# Patient Record
Sex: FEMALE | Race: WHITE | NOT HISPANIC OR LATINO | Employment: FULL TIME | URBAN - NONMETROPOLITAN AREA
[De-identification: names, ages, dates, MRNs, and addresses within clinical notes are randomized per-mention and may not be internally consistent; named-entity substitution may affect disease eponyms.]

---
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
## 2023-02-05 ENCOUNTER — HOSPITAL ENCOUNTER (EMERGENCY)
Facility: HOSPITAL | Age: 18
Discharge: HOME | End: 2023-02-05
Attending: STUDENT IN AN ORGANIZED HEALTH CARE EDUCATION/TRAINING PROGRAM | Admitting: EMERGENCY MEDICINE
Payer: COMMERCIAL

## 2023-02-05 VITALS
OXYGEN SATURATION: 97 % | HEART RATE: 80 BPM | DIASTOLIC BLOOD PRESSURE: 77 MMHG | TEMPERATURE: 98.1 F | HEIGHT: 69 IN | RESPIRATION RATE: 14 BRPM | SYSTOLIC BLOOD PRESSURE: 123 MMHG | BODY MASS INDEX: 19.26 KG/M2 | WEIGHT: 130 LBS

## 2023-02-05 DIAGNOSIS — T50.902A INTENTIONAL OVERDOSE, INITIAL ENCOUNTER: Primary | ICD-10-CM

## 2023-02-05 LAB
ALBUMIN SERPL-MCNC: 4.7 G/DL (ref 3.5–5.2)
ALBUMIN/GLOB SERPL: 1.6 G/DL
ALP SERPL-CCNC: 66 U/L (ref 39–117)
ALT SERPL W P-5'-P-CCNC: <5 U/L (ref 1–33)
AMMONIA BLD-SCNC: 23 UMOL/L (ref 11–51)
AMPHET+METHAMPHET UR QL: NEGATIVE
AMPHETAMINES UR QL: NEGATIVE
ANION GAP SERPL CALCULATED.3IONS-SCNC: 10.5 MMOL/L (ref 5–15)
APAP SERPL-MCNC: <5 MCG/ML (ref 0–30)
AST SERPL-CCNC: 13 U/L (ref 1–32)
B-HCG UR QL: NEGATIVE
BARBITURATES UR QL SCN: NEGATIVE
BASOPHILS # BLD AUTO: 0.04 10*3/MM3 (ref 0–0.2)
BASOPHILS NFR BLD AUTO: 0.6 % (ref 0–1.5)
BENZODIAZ UR QL SCN: NEGATIVE
BILIRUB SERPL-MCNC: 0.4 MG/DL (ref 0–1.2)
BILIRUB UR QL STRIP: NEGATIVE
BUN SERPL-MCNC: 5 MG/DL (ref 6–20)
BUN/CREAT SERPL: 8.2 (ref 7–25)
BUPRENORPHINE SERPL-MCNC: NEGATIVE NG/ML
CALCIUM SPEC-SCNC: 9.5 MG/DL (ref 8.6–10.5)
CANNABINOIDS SERPL QL: NEGATIVE
CHLORIDE SERPL-SCNC: 101 MMOL/L (ref 98–107)
CLARITY UR: CLEAR
CO2 SERPL-SCNC: 25.5 MMOL/L (ref 22–29)
COCAINE UR QL: NEGATIVE
COLOR UR: YELLOW
CREAT SERPL-MCNC: 0.61 MG/DL (ref 0.57–1)
CRP SERPL-MCNC: <0.3 MG/DL (ref 0–0.5)
D-LACTATE SERPL-SCNC: 1.8 MMOL/L (ref 0.5–2)
DEPRECATED RDW RBC AUTO: 37.4 FL (ref 37–54)
EGFRCR SERPLBLD CKD-EPI 2021: 132.3 ML/MIN/1.73
EOSINOPHIL # BLD AUTO: 0.04 10*3/MM3 (ref 0–0.4)
EOSINOPHIL NFR BLD AUTO: 0.6 % (ref 0.3–6.2)
ERYTHROCYTE [DISTWIDTH] IN BLOOD BY AUTOMATED COUNT: 11.7 % (ref 12.3–15.4)
ETHANOL BLD-MCNC: 90 MG/DL (ref 0–10)
ETHANOL UR QL: 0.09 %
GLOBULIN UR ELPH-MCNC: 2.9 GM/DL
GLUCOSE SERPL-MCNC: 90 MG/DL (ref 65–99)
GLUCOSE UR STRIP-MCNC: NEGATIVE MG/DL
HCT VFR BLD AUTO: 41.9 % (ref 34–46.6)
HGB BLD-MCNC: 14.4 G/DL (ref 12–15.9)
HGB UR QL STRIP.AUTO: NEGATIVE
HOLD SPECIMEN: NORMAL
HOLD SPECIMEN: NORMAL
IMM GRANULOCYTES # BLD AUTO: 0.01 10*3/MM3 (ref 0–0.05)
IMM GRANULOCYTES NFR BLD AUTO: 0.1 % (ref 0–0.5)
KETONES UR QL STRIP: NEGATIVE
LEUKOCYTE ESTERASE UR QL STRIP.AUTO: NEGATIVE
LIPASE SERPL-CCNC: 26 U/L (ref 13–60)
LYMPHOCYTES # BLD AUTO: 2.61 10*3/MM3 (ref 0.7–3.1)
LYMPHOCYTES NFR BLD AUTO: 37.4 % (ref 19.6–45.3)
MAGNESIUM SERPL-MCNC: 1.9 MG/DL (ref 1.7–2.2)
MCH RBC QN AUTO: 30.6 PG (ref 26.6–33)
MCHC RBC AUTO-ENTMCNC: 34.4 G/DL (ref 31.5–35.7)
MCV RBC AUTO: 89 FL (ref 79–97)
METHADONE UR QL SCN: NEGATIVE
MONOCYTES # BLD AUTO: 0.36 10*3/MM3 (ref 0.1–0.9)
MONOCYTES NFR BLD AUTO: 5.2 % (ref 5–12)
NEUTROPHILS NFR BLD AUTO: 3.92 10*3/MM3 (ref 1.7–7)
NEUTROPHILS NFR BLD AUTO: 56.1 % (ref 42.7–76)
NITRITE UR QL STRIP: NEGATIVE
NRBC BLD AUTO-RTO: 0 /100 WBC (ref 0–0.2)
OPIATES UR QL: NEGATIVE
OXYCODONE UR QL SCN: NEGATIVE
PCP UR QL SCN: NEGATIVE
PH UR STRIP.AUTO: 6.5 [PH] (ref 5–8)
PHOSPHATE SERPL-MCNC: 3 MG/DL (ref 2.5–4.5)
PLATELET # BLD AUTO: 290 10*3/MM3 (ref 140–450)
PMV BLD AUTO: 9.5 FL (ref 6–12)
POTASSIUM SERPL-SCNC: 3.9 MMOL/L (ref 3.5–5.2)
PROPOXYPH UR QL: NEGATIVE
PROT SERPL-MCNC: 7.6 G/DL (ref 6–8.5)
PROT UR QL STRIP: NEGATIVE
RBC # BLD AUTO: 4.71 10*6/MM3 (ref 3.77–5.28)
SALICYLATES SERPL-MCNC: <0.3 MG/DL
SARS-COV-2 RNA PNL SPEC NAA+PROBE: NOT DETECTED
SODIUM SERPL-SCNC: 137 MMOL/L (ref 136–145)
SP GR UR STRIP: 1.01 (ref 1–1.03)
TRICYCLICS UR QL SCN: NEGATIVE
TROPONIN T SERPL-MCNC: <0.01 NG/ML (ref 0–0.03)
UROBILINOGEN UR QL STRIP: NORMAL
WBC NRBC COR # BLD: 6.98 10*3/MM3 (ref 3.4–10.8)
WHOLE BLOOD HOLD COAG: NORMAL
WHOLE BLOOD HOLD SPECIMEN: NORMAL

## 2023-02-05 PROCEDURE — 80053 COMPREHEN METABOLIC PANEL: CPT | Performed by: STUDENT IN AN ORGANIZED HEALTH CARE EDUCATION/TRAINING PROGRAM

## 2023-02-05 PROCEDURE — 87635 SARS-COV-2 COVID-19 AMP PRB: CPT | Performed by: STUDENT IN AN ORGANIZED HEALTH CARE EDUCATION/TRAINING PROGRAM

## 2023-02-05 PROCEDURE — 83690 ASSAY OF LIPASE: CPT | Performed by: STUDENT IN AN ORGANIZED HEALTH CARE EDUCATION/TRAINING PROGRAM

## 2023-02-05 PROCEDURE — 83605 ASSAY OF LACTIC ACID: CPT | Performed by: STUDENT IN AN ORGANIZED HEALTH CARE EDUCATION/TRAINING PROGRAM

## 2023-02-05 PROCEDURE — 99285 EMERGENCY DEPT VISIT HI MDM: CPT

## 2023-02-05 PROCEDURE — 93005 ELECTROCARDIOGRAM TRACING: CPT | Performed by: STUDENT IN AN ORGANIZED HEALTH CARE EDUCATION/TRAINING PROGRAM

## 2023-02-05 PROCEDURE — 81003 URINALYSIS AUTO W/O SCOPE: CPT | Performed by: STUDENT IN AN ORGANIZED HEALTH CARE EDUCATION/TRAINING PROGRAM

## 2023-02-05 PROCEDURE — 83735 ASSAY OF MAGNESIUM: CPT | Performed by: STUDENT IN AN ORGANIZED HEALTH CARE EDUCATION/TRAINING PROGRAM

## 2023-02-05 PROCEDURE — 80179 DRUG ASSAY SALICYLATE: CPT | Performed by: STUDENT IN AN ORGANIZED HEALTH CARE EDUCATION/TRAINING PROGRAM

## 2023-02-05 PROCEDURE — 82077 ASSAY SPEC XCP UR&BREATH IA: CPT | Performed by: STUDENT IN AN ORGANIZED HEALTH CARE EDUCATION/TRAINING PROGRAM

## 2023-02-05 PROCEDURE — 84484 ASSAY OF TROPONIN QUANT: CPT | Performed by: STUDENT IN AN ORGANIZED HEALTH CARE EDUCATION/TRAINING PROGRAM

## 2023-02-05 PROCEDURE — 80306 DRUG TEST PRSMV INSTRMNT: CPT | Performed by: STUDENT IN AN ORGANIZED HEALTH CARE EDUCATION/TRAINING PROGRAM

## 2023-02-05 PROCEDURE — 84100 ASSAY OF PHOSPHORUS: CPT | Performed by: STUDENT IN AN ORGANIZED HEALTH CARE EDUCATION/TRAINING PROGRAM

## 2023-02-05 PROCEDURE — 86140 C-REACTIVE PROTEIN: CPT | Performed by: STUDENT IN AN ORGANIZED HEALTH CARE EDUCATION/TRAINING PROGRAM

## 2023-02-05 PROCEDURE — 81025 URINE PREGNANCY TEST: CPT | Performed by: STUDENT IN AN ORGANIZED HEALTH CARE EDUCATION/TRAINING PROGRAM

## 2023-02-05 PROCEDURE — 82140 ASSAY OF AMMONIA: CPT | Performed by: STUDENT IN AN ORGANIZED HEALTH CARE EDUCATION/TRAINING PROGRAM

## 2023-02-05 PROCEDURE — 85025 COMPLETE CBC W/AUTO DIFF WBC: CPT | Performed by: STUDENT IN AN ORGANIZED HEALTH CARE EDUCATION/TRAINING PROGRAM

## 2023-02-05 PROCEDURE — 93005 ELECTROCARDIOGRAM TRACING: CPT | Performed by: EMERGENCY MEDICINE

## 2023-02-05 PROCEDURE — C9803 HOPD COVID-19 SPEC COLLECT: HCPCS

## 2023-02-05 PROCEDURE — 80143 DRUG ASSAY ACETAMINOPHEN: CPT | Performed by: STUDENT IN AN ORGANIZED HEALTH CARE EDUCATION/TRAINING PROGRAM

## 2023-02-05 RX ORDER — SODIUM CHLORIDE 0.9 % (FLUSH) 0.9 %
10 SYRINGE (ML) INJECTION AS NEEDED
Status: DISCONTINUED | OUTPATIENT
Start: 2023-02-05 | End: 2023-02-05 | Stop reason: HOSPADM

## 2023-02-05 NOTE — NURSING NOTE
Mom presented with pt, sitting in the quiet room.  Mom reports pt has a hx of cutting/self harm since age 10yo. Pt battles depression/anxiety and takes prozac and abilify daily until she admitted to parents around carolyn she had stopped taking her medications.  Pt drinks alcohol, smokes marijuana and stays out late at night.   Pt has recently been diagnosed with borderline personality disorder and is currently in a [employer] study.   Mom reports pt lives at home with she and her  and their 3yo child. Both parents are nurses. 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

## 2023-02-05 NOTE — ED NOTES
Called behavioral health to inquire about consult and to inform them about recommendation from poison control. Behavioral health to evaluate pt between [id number] today.

## 2023-02-05 NOTE — CONSULTS
"Joanie Renae  2005    Preferred Pronouns: She/Her      Time Called for Assessment: 1255     Assessment Start and End: 7032-0207    Orientation: alert and oriented to person, place, and time     Is patient agreeable to admission/treatment? Yes    Guardian Name/Contact/etc: Self    Pt Lives With: Mother (Roshni), Stepfather (Pierce) and 3yo sister    Highest Level of Education: high school diploma/GED     Presenting Problems: Patient presents to emergency department following an intentional overdose. Patient reports taking Abilify 5mg 10 tablets, Prozac 20mg 10 tablets and Advil (unknown mg) at 0430 this morning. Immediately following attempt, patient regretted decision, informed her mother and arrived to the emergency department.     Mood: within normal limits     Current Stressors: illness and mental health condition    Depression: 2     Hopelessness: no    Anxiety: 4    Sleep: Fair    Appetite: Good    Delusions: Patient presents with linear thought processing.     Hallucinations: None    Homicidal Ideations: Absent     Current Mental Healthcare: Patient is currently involved in a BPD Research Study at . She meets with a  student once a week for assessments and DBT. Patient reports this last 18 weeks and she is penitentiary through. Patient reports seeing Hali Aquino MD at  for medication management. No current enrolled in therapy. Has an upcoming Psychiatrist appointment on 2/7/2023 for medication and meets with the BPD research program on Tuesday, 2/7/2023 as well.     Current Psychiatric Medications: Patient reports she was taking Abilify 5mg and Prozac 20mg. She admits she stopped taking these around 1 month ago because she did not \"feel good\" when taking them. Patient reports she is planning to re-discuss medication with her doctor at upcoming appointment this week.    Hx of Psychiatric Treatment: No. Patient denies any hx of inpatient psych treatment.    Most recent inpatient admission: " n/a    Last outpatient visit: Research appointment, 1 week ago. Psychiatrist 1 month ago.       [place]-SUICIDE SEVERITY RATING SCALE  Psychiatric Inpatient Setting - Discharge Screener    Ask questions that are bold and underlined Discharge   Ask Questions 1 and 2 YES NO   1) Wish to be Dead:   Person endorses thoughts about a wish to be dead or not alive anymore, or wish to fall asleep and not wake up.  While you were here in the hospital, have you wished you were dead or wished you could go to sleep and not wake up?  X   2) Suicidal Thoughts:   General non-specific thoughts of wanting to end one's life/die by suicide, “I've thought about killing myself” without general thoughts of ways to kill oneself/associated methods, intent, or plan.   While you were here in the hospital, have you actually had thoughts about killing yourself?   X   If YES to 2, ask questions 3, 4, 5, and 6.  If NO to 2, go directly to question 6   3) Suicidal Thoughts with Method (without Specific Plan or Intent to Act):   Person endorses thoughts of suicide and has thought of a least one method during the assessment period. This is different than a specific plan with time, place or method details worked out. “I thought about taking an overdose but I never made a specific plan as to when where or how I would actually do it….and I would never go through with it.”   Have you been thinking about how you might kill yourself?      4) Suicidal Intent (without Specific Plan):   Active suicidal thoughts of killing oneself and patient reports having some intent to act on such thoughts, as opposed to “I have the thoughts but I definitely will not do anything about them.”   Have you had these thoughts and had some intention of acting on them or do you have some intention of acting on them after you leave the hospital?      5) Suicide Intent with Specific Plan:   Thoughts of killing oneself with details of plan fully or partially worked out and person  "has some intent to carry it out.   Have you started to work out or worked out the details of how to kill yourself either for while you were here in the hospital or for after you leave the hospital? Do you intend to carry out this plan?        6) Suicide Behavior    While you were here in the hospital, have you done anything, started to do anything, or prepared to do anything to end your life?    Examples: Took pills, cut yourself, tried to hang yourself, took out pills but didn't swallow any because you changed your mind or someone took them from you, collected pills, secured a means of obtaining a gun, gave away valuables, wrote a will or suicide note, etc.  X     Suicidal: Absent. Patient is denying a current death wish, plan or intent to kill/harm self. Patient reports as soon as she took medication, she had \"instant regret\" and does not wish to be dead.     Previous Attempts: None. (current attempt is reported 1st).     Most Recent Attempt: 0430 this morning.     PSYCHOSOCIAL HISTORY:    Highest Level of Education: high school diploma/GED     Family Hx of Mental Health/Substance Abuse:     Patient Trauma/Abuse History: Further details: Patient admits to hx of PTSD. Does not disclose further. Denies current abuse.     Does this require reporting: No    Legal History / History of Violence: The patient has no significant history of legal issues.      History of Inappropriate Sexual Behavior: No      SUBSTANCE USE HISTORY:    Substance Use History:    MAT/MOUD:       SUBSTANCE   PRESENT USE  Y/N   AGE @ 1ST USE    ROUTE   HOW MUCH & OFTEN   HOW LONG AT THIS RATE   DATE/AMOUNT OF LAST USE   Nicotine N        Alcohol Y   Once a week (socially)  1 week ago   Marijuana N     2 months ago   Benzos         Neurontin         Methadone         Opiates/ Fentanyl          Cocaine          Heroin         Meth/Ice         Suboxone             History of DT's: No    Current Medical Conditions or Biomedical Complications: Yes. " "Patient reports dx of BPD, PTSD, Depression and Anxiety.      DATA:   This therapist received a call from [facility] staff LUKAS Christensen, with orders from Dr. Lugo, for a behavioral health consult.  The patient is agreeable to speak with the behavioral health team.  Met with patient at bedside. Patient is under 1:1 security monitoring during assessment.  Patient is a 17 year old, single, , female residing in [place], [place]. Patient currently lives with mother, stepfather and 3yo sister.     Patient presents today with chief compliant of suicide attempt. Patient presents to emergency department following an intentional overdose. Patient reports taking Abilify 5mg 10 tablets, Prozac 20mg 10 tablets and Advil (unknown mg) at 0430 this morning. Immediately following attempt, patient regretted decision, informed her mother and arrived to the emergency department. When discussing what led up to attempt, patient reports her family has been recovering from Strep and COVID over the past few weeks. Patient reports she has been very isolated at home and has spent majority of the time in her room alone. Patient states she has not been able to \"get out\" and have \"interaction\" with others, which she believes worsened her depression. Patient reports \"nothing particular happened\", but \"2 weeks straight of sitting in my room alone\". Patient reports she had left over medication that was prescribed to her, but she stopped taking due to it making her feel bad. Patient states she took the medication in her room and \"immediately\" regretted the decision and \"did not want to be dead\". Patient states this was \"a wake up call\" because \"I don't want to die\". Patient states she was instantly \"regretful\" and knows that she has \"such a supportive family\".   Patient admits that she has a dx of BPD, PTSD, depression and anxiety. She reports that she has not been established with a therapist in close to one year and wishes to " "restart this immediately. Patient identifies that she did not reach out to her family/friends or support like she should and plans to do this in the future. Patient did want her family involved with safety planning and recommendations. Mother and stepfather joined in once assessment was complete, per patients wishes.   Therapist and patient discussed inpatient vs outpatient care. Patient reports that \"when I was younger\" inpatient \"may have been appropriate\". However, patient reports right now she does not think inpatient is necessary as she \"does not want to die\" and identifies her support persons (mother, stepfather, aunt, grandmother) as well as other protective factors. Patient reports she feels safe and comfortable following up with outpatient providers for therapist, medication management and continuing her BPD research program.  This therapist safety planned in depth with patient, her mother and stepfather. We discussed safety plan of reporting to nearest hospital, or call police/911 if feeling unsafe, if having suicidal or homicidal thoughts, or if in emergent need of medications verbally reviewed with patient during assessment and suicide prevention/crisis hotlines verbally reviewed with patient during assessment.  Patient during assessment verbally agreed to safety plan as did both parents. Patient reports to be agreeable for treatment recommendations. We discussed reasons to seek crisis services and how to utilize crisis hotlines or the ED services 24/7. We discussed locking up/removing all weapons/sharps/medication. Patient reports she does have a razor blade in backpack for work, but did report this to mother and she will remove.   In addition, the patient has multiple protective factors including: strong and supportive family/friends which were identified, follow-up outpatient appointments within 48 hours, access and awareness of crisis and support services, ability to safety plan. Patient is agreeable " to establish/engage in outpatient behavioral health services. Therapist provided resources for outpatient providers in the area. I also discussed the availability of emergency behavioral health services 24/7 through the [facility].  Assisted patient in identifying risk factors that would indicate the need for higher level of care, such as thoughts to harm self or others and/or self-harming behavior(s). Encouraged patient to call 911, crisis hotlines, or present to the nearest emergency department should symptoms worsen, or in any crisis/emergency. Patient agreeable and voiced understanding.     ASSESSMENT:    Therapist completed CSSRS with patient for suicide risk assessment. The results of patient’s CSSRS suggest that patient is currently denying a death wish, suicidal ideation, intent, plan or behavior. Patient holds attention and is Cooperative with assessment.  Patient’s appearance is clean and casually dressed, appropriate.  The patient displays Appropriate psychomotor behavior. The patient's affect appears normal. The patient is observed to have normal rate, tone and rhythm of speech.   Patient observed to have Good eye contact. The patient's displays good insight, with fair impulse control and fair judgement.     PLAN:    At this time, therapist recommends outpatient treatment based upon the above assessment. Therapist collaborated with provider Dr. Lugo who agrees to recommendations and strict follow-up outpatient. Therapist staffed with patient and treatment team members who are agreeable to plan.     Although Concepción has the potential to benefit from the inpatient level of care, [place] is not agreeable for an acute admission at this time. Patient does not present with criteria to warrant an involuntary petition or psychiatric hold at this time as she is not endorsing active suicidal ideation/intent, homicidal, or displaying symptoms of an acute psychotic episode without ability to appropriately plan  for safety.  In addition, the patient has multiple protective factors including: strong and supportive family/friends which were identified, follow-up outpatient appointments within 48 hours, access and awareness of crisis and support services, ability to safety plan. Patient is agreeable to establish/engage in outpatient behavioral health services.  Therapist provided resources for outpatient providers in the area. I also discussed the availability of emergency behavioral health services 24/7 through the [facility] ER.  Assisted patient in identifying risk factors that would indicate the need for higher level of care, such as thoughts to harm self or others and/or self-harming behavior(s). Encouraged patient to call 911, crisis hotlines, or present to the nearest emergency department should symptoms worsen, or in any crisis/emergency. Patient agreeable and voiced understanding.     Andree Sahni, MSW, CSW

## 2023-02-05 NOTE — NURSING NOTE
Poison control:jamari    50-75 of charcoal    Watch for:  Renal toxic, n/v  Serotonin syndrome, tachy, ECG  Sleepy, dizzy    Obs: 6-12hrs from ingestion (1039)

## 2023-02-05 NOTE — ED PROVIDER NOTES
Subjective  History of Present Illness:    Patient is a 17-year-old female who presents with intentional ingestion.  Reportedly took leftover bottles full of Abilify, Prozac, ibuprofen.  Unknown how many pills but at least 10 per bottle.  Patient endorsing headache and lightheadedness since that time.  Occurred 45 minutes prior to arrival.  Denies any preceding medical complaints.  States that this was done in an attempt to kill herself.  Denies any chest pain, shortness of breath, abdominal pain at this time.      Nurses Notes reviewed and agree, including vitals, allergies, social history and prior medical history.     REVIEW OF SYSTEMS: All systems reviewed and not pertinent unless noted.  Review of Systems   Constitutional: Positive for activity change. Negative for appetite change, chills, fatigue and fever.   HENT: Negative for rhinorrhea, sinus pressure and sinus pain.    Eyes: Negative for discharge and itching.   Respiratory: Negative for cough and shortness of breath.    Cardiovascular: Negative for chest pain and leg swelling.   Gastrointestinal: Negative for abdominal distention, abdominal pain, nausea and vomiting.   Endocrine: Negative for cold intolerance and heat intolerance.   Genitourinary: Negative for decreased urine volume, difficulty urinating, flank pain, frequency, urgency, vaginal bleeding, vaginal discharge and vaginal pain.   Musculoskeletal: Negative for gait problem, neck pain and neck stiffness.   Skin: Negative for color change.   Allergic/Immunologic: Negative for environmental allergies.   Neurological: Positive for dizziness and headaches. Negative for seizures, syncope, facial asymmetry and speech difficulty.   Psychiatric/Behavioral: Positive for behavioral problems, self-injury and suicidal ideas.       Past Medical History:   Diagnosis Date   • Allergic     seasonal allergies   • Anxiety    • Borderline personality disorder (HCC)    • Depression    • History of strep sore throat  "   • Scoliosis        Allergies:    Patient has no known allergies.      History reviewed. No pertinent surgical history.      Social History     Socioeconomic History   • Marital status: Single   Tobacco Use   • Smoking status: Every Day     Types: Electronic Cigarette     Passive exposure: Never   • Smokeless tobacco: Never   Vaping Use   • Vaping Use: Some days   Substance and Sexual Activity   • Alcohol use: Yes     Comment: socially   • Drug use: Yes     Frequency: 2.0 times per week     Types: Marijuana   • Sexual activity: Yes     Partners: Male     Birth control/protection: Condom         Family History   Problem Relation Age of Onset   • ADD / ADHD Maternal Aunt    • Arthritis Other    • Osteoporosis Other    • Colon cancer Other    • Diabetes Other         type II   • Hyperlipidemia Other    • Hypertension Other    • Anxiety disorder Mother    • No Known Problems Maternal Grandmother    • No Known Problems Maternal Grandfather    • Depression Father    • Drug abuse Father        Objective  Physical Exam:  /77 (BP Location: Left arm, Patient Position: Sitting)   Pulse 80   Temp 98.1 °F (36.7 °C) (Oral)   Resp 14   Ht 175.3 cm (69\")   Wt 59 kg (130 lb)   SpO2 97%   BMI 19.20 kg/m²      Physical Exam  Constitutional:       General: She is not in acute distress.     Appearance: Normal appearance. She is not ill-appearing.   HENT:      Head: Normocephalic and atraumatic.      Nose: Nose normal. No congestion or rhinorrhea.      Mouth/Throat:      Mouth: Mucous membranes are moist.      Pharynx: Oropharynx is clear. No oropharyngeal exudate or posterior oropharyngeal erythema.   Eyes:      Extraocular Movements: Extraocular movements intact.      Conjunctiva/sclera: Conjunctivae normal.      Pupils: Pupils are equal, round, and reactive to light.   Cardiovascular:      Rate and Rhythm: Regular rhythm. Tachycardia present.      Pulses: Normal pulses.      Heart sounds: Normal heart sounds. "   Pulmonary:      Effort: Pulmonary effort is normal. No respiratory distress.      Breath sounds: Normal breath sounds.   Abdominal:      General: Abdomen is flat. Bowel sounds are normal. There is no distension.      Palpations: Abdomen is soft.      Tenderness: There is no abdominal tenderness. There is no guarding or rebound.   Musculoskeletal:         General: No swelling or tenderness. Normal range of motion.      Cervical back: Normal range of motion and neck supple.   Skin:     General: Skin is warm and dry.      Capillary Refill: Capillary refill takes less than 2 seconds.   Neurological:      General: No focal deficit present.      Mental Status: She is alert and oriented to person, place, and time. Mental status is at baseline.      Cranial Nerves: No cranial nerve deficit.      Sensory: No sensory deficit.      Motor: No weakness.      Coordination: Coordination normal.      Gait: Gait normal.   Psychiatric:         Mood and Affect: Mood normal.         Behavior: Behavior normal.         Thought Content: Thought content normal.         Judgment: Judgment normal.         Procedures    ED Course:    ED Course as of 02/05/23 1506   Sun Feb 05, 2023   0621 KG interpreted by me, sinus tachycardia with rate of 116, no concerning ST changes noted, appropriate QTc at 379, abnormal EKG [JE]   0956 Repeat EKG interpreted by me reveals sinus rhythm with rate of 75 bpm.  There is an incomplete right bundle branch block.  QTc is within the normal range.  QRS duration has improved.  Is an atypical appearing EKG. [TB]      ED Course User Index  [JE] Franco Becerra MD  [TB] Lisa Lugo MD       Lab Results (last 24 hours)     Procedure Component Value Units Date/Time    CBC & Differential [[id number]]  (Abnormal) Collected: 02/05/23 0534    Specimen: Blood Updated: 02/05/23 0544    Narrative:      The following orders were created for panel order CBC & Differential.  Procedure                                Abnormality         Status                     ---------                               -----------         ------                     CBC Auto Differential[[id number]]        Abnormal            Final result                 Please view results for these tests on the individual orders.    Comprehensive Metabolic Panel [[id number]]  (Abnormal) Collected: 02/05/23 0534    Specimen: Blood Updated: 02/05/23 0603     Glucose 90 mg/dL      BUN 5 mg/dL      Creatinine 0.61 mg/dL      Sodium 137 mmol/L      Potassium 3.9 mmol/L      Chloride 101 mmol/L      CO2 25.5 mmol/L      Calcium 9.5 mg/dL      Total Protein 7.6 g/dL      Albumin 4.7 g/dL      ALT (SGPT) <5 U/L      AST (SGOT) 13 U/L      Alkaline Phosphatase 66 U/L      Total Bilirubin 0.4 mg/dL      Globulin 2.9 gm/dL      A/G Ratio 1.6 g/dL      BUN/Creatinine Ratio 8.2     Anion Gap 10.5 mmol/L      eGFR 132.3 mL/min/1.73     Narrative:      GFR Normal >60  Chronic Kidney Disease <60  Kidney Failure <15      Acetaminophen Level [[id number]]  (Normal) Collected: 02/05/23 0534    Specimen: Blood Updated: 02/05/23 0601     Acetaminophen <5.0 mcg/mL     Narrative:      Toxic = Greater than 150 mcg/mL    Ethanol [[id number]]  (Abnormal) Collected: 02/05/23 0534    Specimen: Blood Updated: 02/05/23 0601     Ethanol 90 mg/dL      Ethanol % 0.090 %     Narrative:      This result is for medical use only and should not be used for forensic purposes.    Salicylate Level [[id number]]  (Normal) Collected: 02/05/23 0534    Specimen: Blood Updated: 02/05/23 0601     Salicylate <0.3 mg/dL     Urine Drug Screen - Urine, Clean Catch [[id number]]  (Normal) Collected: 02/05/23 0534    Specimen: Urine, Clean Catch Updated: 02/05/23 0620     THC, Screen, Urine Negative     Phencyclidine (PCP), Urine Negative     Cocaine Screen, Urine Negative     Methamphetamine, Ur Negative     Opiate Screen Negative     Amphetamine Screen, Urine Negative     Benzodiazepine Screen, Urine Negative      Tricyclic Antidepressants Screen Negative     Methadone Screen, Urine Negative     Barbiturates Screen, Urine Negative     Oxycodone Screen, Urine Negative     Propoxyphene Screen Negative     Buprenorphine, Screen, Urine Negative    Narrative:      Limitations of this procedure include the possibility of false positives due to interfering substances in the urine sample. Clinical data should be correlated with any questionable result. Positive results should be considered Presumptive Positive until results are confirmed with another methodology such as HPLC or GCMS.    Pregnancy, Urine - Urine, Clean Catch [[id number]]  (Normal) Collected: 02/05/23 0534    Specimen: Urine, Clean Catch Updated: 02/05/23 0550     HCG, Urine QL Negative    Urinalysis With Microscopic If Indicated (No Culture) - Urine, Clean Catch [[id number]]  (Normal) Collected: 02/05/23 0534    Specimen: Urine, Clean Catch Updated: 02/05/23 0545     Color, UA Yellow     Appearance, UA Clear     pH, UA 6.5     Specific Gravity, UA 1.007     Glucose, UA Negative     Ketones, UA Negative     Bilirubin, UA Negative     Blood, UA Negative     Protein, UA Negative     Leuk Esterase, UA Negative     Nitrite, UA Negative     Urobilinogen, UA 0.2 E.U./dL    Narrative:      Urine microscopic not indicated.    CBC Auto Differential [[id number]]  (Abnormal) Collected: 02/05/23 0534    Specimen: Blood Updated: 02/05/23 0544     WBC 6.98 10*3/mm3      RBC 4.71 10*6/mm3      Hemoglobin 14.4 g/dL      Hematocrit 41.9 %      MCV 89.0 fL      MCH 30.6 pg      MCHC 34.4 g/dL      RDW 11.7 %      RDW-SD 37.4 fl      MPV 9.5 fL      Platelets 290 10*3/mm3      Neutrophil % 56.1 %      Lymphocyte % 37.4 %      Monocyte % 5.2 %      Eosinophil % 0.6 %      Basophil % 0.6 %      Immature Grans % 0.1 %      Neutrophils, Absolute 3.92 10*3/mm3      Lymphocytes, Absolute 2.61 10*3/mm3      Monocytes, Absolute 0.36 10*3/mm3      Eosinophils, Absolute 0.04 10*3/mm3      Basophils,  Absolute 0.04 10*3/mm3      Immature Grans, Absolute 0.01 10*3/mm3      nRBC 0.0 /100 WBC     COVID-19,[facility]-HOU[facility],Nasal Swab No Transport Media 3-4 HR TAT - Swab, Nasal Cavity [[id number]]  (Normal) Collected: 02/05/23 0534    Specimen: Swab from Nasal Cavity Updated: 02/05/23 0620     COVID19 Not Detected    Narrative:      Fact sheet for providers: https://www.fda.gov/media/433692/download     Fact sheet for patients: https://www.fda.gov/media/420206/download    Test performed by PCR.    Consider negative results in combination with clinical observations, patient history, and epidemiological information.    Lipase [[id number]]  (Normal) Collected: 02/05/23 0534    Specimen: Blood Updated: 02/05/23 0603     Lipase 26 U/L     Troponin [[id number]]  (Normal) Collected: 02/05/23 0534    Specimen: Blood Updated: 02/05/23 0603     Troponin T <0.010 ng/mL     Narrative:      Troponin T Reference Range:  <= 0.03 ng/mL-   Negative for AMI  >0.03 ng/mL-     Abnormal for myocardial necrosis.  Clinicians would have to utilize clinical acumen, EKG, Troponin and serial changes to determine if it is an Acute Myocardial Infarction or myocardial injury due to an underlying chronic condition.       Results may be falsely decreased if patient taking Biotin.      Lactic Acid, Plasma [[id number]]  (Normal) Collected: 02/05/23 0534    Specimen: Blood Updated: 02/05/23 0559     Lactate 1.8 mmol/L     C-reactive Protein [[id number]]  (Normal) Collected: 02/05/23 0534    Specimen: Blood Updated: 02/05/23 0603     C-Reactive Protein <0.30 mg/dL     Ammonia [[id number]]  (Normal) Collected: 02/05/23 0534    Specimen: Blood Updated: 02/05/23 0605     Ammonia 23 umol/L     Magnesium [[id number]]  (Normal) Collected: 02/05/23 0534    Specimen: Blood Updated: 02/05/23 0603     Magnesium 1.9 mg/dL     Phosphorus [[id number]]  (Normal) Collected: 02/05/23 0534    Specimen: Blood Updated: 02/05/23 0603     Phosphorus 3.0 mg/dL            No  radiology results from the last 24 hrs       [place]    Initial impression of presenting illness: Intentional ingestion    DDX: includes but is not limited to: Tylenol overdose, QT prolongation, suicidal ideation, borderline personality disorder    Patient arrives stable with vitals interpreted by myself.     Pertinent features from physical exam: Tachycardic but with stable blood pressure, intact neuro exam.    Initial diagnostic plan: CBC, CMP, lipase, CRP, ammonia, Tylenol level, salicylate level, ethanol level, UDS, UA    Results from initial plan were reviewed and interpreted by me revealing no acute significantly abnormal findings.    Diagnostic information from other sources: Parents    Interventions / Re-evaluation: Patient resting comfortably.  Repeat EKG shows no QTC or QRS abnormalities.    Results/clinical rationale were discussed with family.  Patient was evaluated by her behavioral therapist after being medically cleared.    Consultations/Discussion of results with other physicians: Discussed with poison control on arrival, recommending supportive care, observation for 6 to 12 hours, repeat EKG to assess QTc, Ativan if agitated.  Discussed the case with the behavioral health therapist.  They have talked to patient and family.  Patient is very remorseful and is not agreeable for inpatient management.  Family believes that this could be detrimental to patient.  Therefore therapist has recommended discharging patient home with a safety planning and close outpatient follow-up.  Given patient's intent to hurt herself during the event I do have concerns about this, however at parent and therapist's request I will discharge patient with a very strict return precautions and safety planning.    Disposition plan: Home with follow-up  -----    Final diagnoses:   Intentional overdose, initial encounter ([facility])        Lisa Lugo MD  02/05/23 9459

## 2023-02-07 ENCOUNTER — TELEPHONE (OUTPATIENT)
Dept: PSYCHIATRY | Facility: CLINIC | Age: 18
End: 2023-02-07
Payer: COMMERCIAL

## 2023-02-09 ENCOUNTER — OFFICE VISIT (OUTPATIENT)
Dept: PSYCHIATRY | Facility: CLINIC | Age: 18
End: 2023-02-09
Payer: COMMERCIAL

## 2023-02-09 ENCOUNTER — TELEPHONE (OUTPATIENT)
Dept: PSYCHIATRY | Facility: CLINIC | Age: 18
End: 2023-02-09

## 2023-02-09 DIAGNOSIS — F33.3 SEVERE EPISODE OF RECURRENT MAJOR DEPRESSIVE DISORDER, WITH PSYCHOTIC FEATURES: Primary | ICD-10-CM

## 2023-02-09 DIAGNOSIS — F41.1 GENERALIZED ANXIETY DISORDER: ICD-10-CM

## 2023-02-09 DIAGNOSIS — F60.3 BORDERLINE PERSONALITY DISORDER: ICD-10-CM

## 2023-02-09 PROCEDURE — 90791 PSYCH DIAGNOSTIC EVALUATION: CPT | Performed by: COUNSELOR
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
## 2023-02-14 ENCOUNTER — OFFICE VISIT (OUTPATIENT)
Dept: PSYCHIATRY | Facility: CLINIC | Age: 18
End: 2023-02-14
Payer: COMMERCIAL

## 2023-02-14 DIAGNOSIS — F41.1 GENERALIZED ANXIETY DISORDER: ICD-10-CM

## 2023-02-14 DIAGNOSIS — F33.3 SEVERE EPISODE OF RECURRENT MAJOR DEPRESSIVE DISORDER, WITH PSYCHOTIC FEATURES: Primary | ICD-10-CM

## 2023-02-14 DIAGNOSIS — F60.3 BORDERLINE PERSONALITY DISORDER: ICD-10-CM

## 2023-02-14 PROCEDURE — 90837 PSYTX W PT 60 MINUTES: CPT | Performed by: COUNSELOR
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
## 2023-03-01 ENCOUNTER — OFFICE VISIT (OUTPATIENT)
Dept: PSYCHIATRY | Facility: CLINIC | Age: 18
End: 2023-03-01
Payer: COMMERCIAL

## 2023-03-01 DIAGNOSIS — F60.3 BORDERLINE PERSONALITY DISORDER: ICD-10-CM

## 2023-03-01 DIAGNOSIS — F41.1 GENERALIZED ANXIETY DISORDER: ICD-10-CM

## 2023-03-01 DIAGNOSIS — F33.3 SEVERE EPISODE OF RECURRENT MAJOR DEPRESSIVE DISORDER, WITH PSYCHOTIC FEATURES: Primary | ICD-10-CM

## 2023-03-01 PROCEDURE — 90834 PSYTX W PT 45 MINUTES: CPT | Performed by: COUNSELOR
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
## 2023-03-09 ENCOUNTER — TELEMEDICINE (OUTPATIENT)
Dept: PSYCHIATRY | Facility: CLINIC | Age: 18
End: 2023-03-09
Payer: COMMERCIAL

## 2023-03-09 DIAGNOSIS — F60.3 BORDERLINE PERSONALITY DISORDER: ICD-10-CM

## 2023-03-09 DIAGNOSIS — F41.1 GENERALIZED ANXIETY DISORDER: ICD-10-CM

## 2023-03-09 DIAGNOSIS — F33.3 SEVERE EPISODE OF RECURRENT MAJOR DEPRESSIVE DISORDER, WITH PSYCHOTIC FEATURES: Primary | ICD-10-CM

## 2023-03-09 PROCEDURE — 90834 PSYTX W PT 45 MINUTES: CPT | Performed by: COUNSELOR
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
## 2023-03-25 NOTE — TELEPHONE ENCOUNTER
Awaiting response from Pharmacy regarding nexplanon. Will contact patient to schedule one device is received.   24-Mar-2023 22:00

## 2023-04-25 ENCOUNTER — OFFICE VISIT (OUTPATIENT)
Dept: INTERNAL MEDICINE | Facility: CLINIC | Age: 18
End: 2023-04-25
Payer: COMMERCIAL

## 2023-04-25 VITALS
SYSTOLIC BLOOD PRESSURE: 102 MMHG | RESPIRATION RATE: 16 BRPM | HEART RATE: 102 BPM | DIASTOLIC BLOOD PRESSURE: 72 MMHG | HEIGHT: 69 IN | TEMPERATURE: 97.2 F | WEIGHT: 139 LBS | OXYGEN SATURATION: 98 % | BODY MASS INDEX: 20.59 KG/M2

## 2023-04-25 DIAGNOSIS — S61.211A LACERATION OF LEFT INDEX FINGER WITHOUT FOREIGN BODY WITHOUT DAMAGE TO NAIL, INITIAL ENCOUNTER: Primary | ICD-10-CM

## 2023-04-25 DIAGNOSIS — G47.9 TROUBLE IN SLEEPING: ICD-10-CM

## 2023-04-25 DIAGNOSIS — T36.95XA ANTIBIOTIC-INDUCED YEAST INFECTION: ICD-10-CM

## 2023-04-25 DIAGNOSIS — J35.1 ENLARGED TONSILS: ICD-10-CM

## 2023-04-25 DIAGNOSIS — B37.9 ANTIBIOTIC-INDUCED YEAST INFECTION: ICD-10-CM

## 2023-04-25 DIAGNOSIS — J03.91 RECURRENT TONSILLITIS: ICD-10-CM

## 2023-04-25 RX ORDER — HYDROXYZINE PAMOATE 25 MG/1
25 CAPSULE ORAL
COMMUNITY
Start: 2023-02-07 | End: 2023-06-07

## 2023-04-25 RX ORDER — FLUCONAZOLE 150 MG/1
TABLET ORAL
Qty: 2 TABLET | Refills: 0 | Status: SHIPPED | OUTPATIENT
Start: 2023-04-25

## 2023-04-25 RX ORDER — SULFAMETHOXAZOLE AND TRIMETHOPRIM 800; 160 MG/1; MG/1
1 TABLET ORAL 2 TIMES DAILY
Qty: 20 TABLET | Refills: 0 | Status: SHIPPED | OUTPATIENT
Start: 2023-04-25 | End: 2023-04-26 | Stop reason: SINTOL

## 2023-04-25 RX ORDER — LAMOTRIGINE 100 MG/1
1 TABLET ORAL DAILY
COMMUNITY
Start: 2023-04-12

## 2023-04-25 RX ORDER — FLUVOXAMINE MALEATE 50 MG/1
50 TABLET, COATED ORAL
COMMUNITY
Start: 2023-04-12

## 2023-04-25 NOTE — PROGRESS NOTES
Chief Complaint  Edema (Discuss getting tonsils taken out ) and Finger Laceration (Left index finger, cut with a knife on Saturday, had to have her ring removed due to swelling from the cut )  Answers for HPI/ROS submitted by the patient on 4/25/2023  What is the primary reason for your visit?: Sore Throat      Daniel Renae presents to [facility]  History of Present Illness  Recurrent tonsil infections this year. Has trouble sleeping at night, breathing. Worried about apnea risks down road. When she gets sick tonsils swell and touch.     laceration to left index finger using knife to cook on Saturday. Had to get ring cut off finger by fire dept. Finger has had yellow drainage.  Sore Throat   This is a chronic problem. The current episode started more than 1 year ago. The problem has been rapidly worsening. Neither side of throat is experiencing more pain than the other. There has been no fever. The fever has been present for less than 1 day. The pain is at a severity of 9/10. Associated symptoms include swollen glands and trouble swallowing. Pertinent negatives include no abdominal pain, congestion, coughing, diarrhea, drooling, ear discharge, ear pain, headaches, hoarse voice, plugged ear sensation, neck pain, shortness of breath, stridor or vomiting. She has had no exposure to strep or mono.     Immunization History   Administered Date(s) Administered   • COVID-19 (MODERNA) BIVALENT 12+YRS 11/02/2022   • COVID-19 (PFIZER) Purple Cap Monovalent 03/23/2021, 04/16/2021, 11/22/2021   • DTaP 02/03/2004, 03/16/2004, 06/30/2004, 04/13/2005, 07/11/2008   • Flu Vaccine Quad PF 6-35MO 11/23/2005   • Flu Vaccine Quad PF >36MO 11/18/2014, 11/22/2021   • FluLaval/Fluzone >6mos 10/30/2020, 11/22/2021, 11/02/2022   • Flublok 18+yrs 04/21/2022   • HPV Quadrivalent 08/10/2015   • Hep A, 2 Dose 03/13/2018, 09/13/2018   • Hepatitis B Adult/Adolescent IM 2005, 02/03/2004,  "06/30/2004   • HiB 02/03/2004, 03/16/2004, 06/30/2004, 04/13/2005   • Hpv9 04/15/2019   • IPV 02/03/2004, 03/16/2004, 06/30/2004, 07/11/2008   • MMR 04/13/2005, 07/11/2008   • Meningococcal Conjugate 08/10/2015   • Meningococcal MCV4P (Menactra) 07/28/2020   • PEDS-Pneumococcal Conjugate (PCV7) 02/03/2004, 03/16/2004, 08/25/2004, 12/08/2004   • Rabies 08/08/2022, 08/11/2022, 08/15/2022, 08/22/2022   • Tdap 08/10/2015   • Varicella 12/08/2004, 07/11/2008        Objective   Vital Signs:  /72 (BP Location: Left arm, Patient Position: Sitting, Cuff Size: Adult)   Pulse 102   Temp 97.2 °F (36.2 °C) (Temporal)   Resp 16   Ht 175.3 cm (69\")   Wt 63 kg (139 lb)   SpO2 98%   BMI 20.53 kg/m²   Estimated body mass index is 20.53 kg/m² as calculated from the following:    Height as of this encounter: 175.3 cm (69\").    Weight as of this encounter: 63 kg (139 lb).  36 %ile (Z= -0.37) based on CDC (Girls, 2-20 Years) BMI-for-age based on BMI available as of 4/25/2023.    BMI is within normal parameters. No other follow-up for BMI required.      Physical Exam  Vitals and nursing note reviewed.   Constitutional:       General: She is not in acute distress.     Appearance: Normal appearance. She is well-developed and well-groomed. She is not ill-appearing, toxic-appearing or diaphoretic.   HENT:      Head: Normocephalic and atraumatic.      Right Ear: Hearing normal.      Left Ear: Hearing normal.      Mouth/Throat:      Lips: Pink.      Mouth: Mucous membranes are moist.      Dentition: Normal dentition.      Tongue: No lesions.      Pharynx: Oropharynx is clear. Uvula midline.      Tonsils: No tonsillar exudate. 2+ on the right. 2+ on the left.   Eyes:      General: Lids are normal. No scleral icterus.     Extraocular Movements: Extraocular movements intact.   Neck:      Trachea: Phonation normal.   Pulmonary:      Effort: Pulmonary effort is normal.   Musculoskeletal:      Cervical back: Neck supple.   Skin:     " Coloration: Skin is not jaundiced or pale.      Findings: Laceration (left index finger wtih swelling and erythema) present.   Neurological:      General: No focal deficit present.      Mental Status: She is alert and oriented to person, place, and time.      Motor: Motor function is intact.   Psychiatric:         Attention and Perception: Attention and perception normal.         Mood and Affect: Mood and affect normal.         Speech: Speech normal.         Behavior: Behavior normal. Behavior is cooperative.         Thought Content: Thought content normal.         Cognition and Memory: Cognition and memory normal.         Judgment: Judgment normal.        Result Review :                   Assessment and Plan   Diagnoses and all orders for this visit:    1. Laceration of left index finger without foreign body without damage to nail, initial encounter (Primary)  -     sulfamethoxazole-trimethoprim (Bactrim DS) 800-160 MG per tablet; Take 1 tablet by mouth 2 (Two) Times a Day for 10 days.  Dispense: 20 tablet; Refill: 0    2. Enlarged tonsils  -     Ambulatory Referral to ENT (Otolaryngology)    3. Trouble in sleeping  -     Ambulatory Referral to ENT (Otolaryngology)    4. Recurrent tonsillitis  -     Ambulatory Referral to ENT (Otolaryngology)    5. Antibiotic-induced yeast infection  -     fluconazole (Diflucan) 150 MG tablet; TAKE ONE TAB PO X ONCE, CAN REPEAT IN 4 DAYS IF NEEDED FOR YEAST INFECTION  Dispense: 2 tablet; Refill: 0             Follow Up   No follow-ups on file.  Patient was given instructions and counseling regarding her condition or for health maintenance advice. Please see specific information pulled into the AVS if appropriate.

## 2023-04-26 ENCOUNTER — PATIENT MESSAGE (OUTPATIENT)
Dept: INTERNAL MEDICINE | Facility: CLINIC | Age: 18
End: 2023-04-26
Payer: COMMERCIAL

## 2023-04-26 DIAGNOSIS — S61.211A LACERATION OF LEFT INDEX FINGER WITHOUT FOREIGN BODY WITHOUT DAMAGE TO NAIL, INITIAL ENCOUNTER: Primary | ICD-10-CM

## 2023-04-26 RX ORDER — DOXYCYCLINE HYCLATE 100 MG/1
100 CAPSULE ORAL 2 TIMES DAILY
Qty: 20 CAPSULE | Refills: 0 | Status: SHIPPED | OUTPATIENT
Start: 2023-04-26 | End: 2023-04-26 | Stop reason: SDUPTHER

## 2023-04-26 RX ORDER — DOXYCYCLINE HYCLATE 100 MG/1
100 CAPSULE ORAL 2 TIMES DAILY
Qty: 20 CAPSULE | Refills: 0 | Status: SHIPPED | OUTPATIENT
Start: 2023-04-26 | End: 2023-05-06

## 2023-06-19 ENCOUNTER — TRANSCRIBE ORDERS (OUTPATIENT)
Dept: LAB | Facility: HOSPITAL | Age: 18
End: 2023-06-19
Payer: COMMERCIAL

## 2023-06-19 DIAGNOSIS — S93.402A SPRAIN OF LEFT ANKLE, INITIAL ENCOUNTER: Primary | ICD-10-CM

## 2023-06-21 ENCOUNTER — TELEPHONE (OUTPATIENT)
Dept: INTERNAL MEDICINE | Facility: CLINIC | Age: 18
End: 2023-06-21

## 2023-06-21 NOTE — TELEPHONE ENCOUNTER
Caller: Joanie Renae    Relationship: Self    Best call back number:     [phone]        What was the call regarding: PATIENT CALLED STATING THAT SHE IS UNABLE TO FIND A BOOT.  PATIENT ASKED FOR SUGGESTIONS ON WHERE SHE COULD FIND A BOOT FOR HER FOOT.     Is it okay if the provider responds through MyChart:

## 2023-06-21 NOTE — TELEPHONE ENCOUNTER
Called Joanie, left a voicemail to call back    If Dr. Melgar is agreeable, she can order a boot and patient can  order and take with her to [place] or she can come into the office [place] schedule and try on any boots we may have through [place].

## 2023-09-13 ENCOUNTER — OFFICE VISIT (OUTPATIENT)
Dept: INTERNAL MEDICINE | Facility: CLINIC | Age: 18
End: 2023-09-13
Payer: COMMERCIAL

## 2023-09-13 VITALS
TEMPERATURE: 98.3 F | DIASTOLIC BLOOD PRESSURE: 72 MMHG | RESPIRATION RATE: 16 BRPM | HEART RATE: 106 BPM | BODY MASS INDEX: 21.71 KG/M2 | HEIGHT: 69 IN | WEIGHT: 146.6 LBS | OXYGEN SATURATION: 98 % | SYSTOLIC BLOOD PRESSURE: 110 MMHG

## 2023-09-13 DIAGNOSIS — R09.81 NASAL CONGESTION: ICD-10-CM

## 2023-09-13 DIAGNOSIS — U07.1 COVID-19 VIRUS DETECTED: ICD-10-CM

## 2023-09-13 DIAGNOSIS — J02.9 SORE THROAT: ICD-10-CM

## 2023-09-13 DIAGNOSIS — R23.3 BRUISING, SPONTANEOUS: Primary | ICD-10-CM

## 2023-09-13 LAB
EXPIRATION DATE: ABNORMAL
EXPIRATION DATE: NORMAL
FLUAV AG UPPER RESP QL IA.RAPID: NOT DETECTED
FLUBV AG UPPER RESP QL IA.RAPID: NOT DETECTED
INTERNAL CONTROL: ABNORMAL
INTERNAL CONTROL: NORMAL
Lab: ABNORMAL
Lab: NORMAL
S PYO AG THROAT QL: NEGATIVE
SARS-COV-2 AG UPPER RESP QL IA.RAPID: DETECTED

## 2023-09-13 PROCEDURE — 87880 STREP A ASSAY W/OPTIC: CPT | Performed by: FAMILY MEDICINE

## 2023-09-13 PROCEDURE — 99214 OFFICE O/P EST MOD 30 MIN: CPT | Performed by: FAMILY MEDICINE

## 2023-09-13 PROCEDURE — 87428 SARSCOV & INF VIR A&B AG IA: CPT | Performed by: FAMILY MEDICINE

## 2023-09-13 RX ORDER — FLUOXETINE HCL 20 MG
20 CAPSULE ORAL DAILY
COMMUNITY

## 2023-09-13 RX ORDER — BUSPIRONE HYDROCHLORIDE 5 MG/1
5 TABLET ORAL 2 TIMES DAILY
COMMUNITY
Start: 2023-09-12

## 2023-09-13 NOTE — PROGRESS NOTES
"Chief Complaint  Bleeding/Bruising (Pt states for the last month she has been getting random bruising down her legs ) and Nasal Congestion (Cough and congestion, requesting covid flu and strep test)  Answers submitted by the patient for this visit:  Other (Submitted on 9/11/2023)  Please describe your symptoms.: Unknown bruising thats been getting worse  Have you had these symptoms before?: No  How long have you been having these symptoms?: Greater than 2 weeks  Please describe any probable cause for these symptoms. : No injuries  Primary Reason for Visit (Submitted on 9/11/2023)  What is the primary reason for your visit?: Other    Subjective        Joanie Renae presents to [facility]  History of Present Illness  Covid symptoms started Sunday.     Has always bruised easily but worse than usual. Tiny dots on legs. 1-2 months worse but especially last two weeks. No family history of bleeding issues. No NSAID use (maybe twice a month on Advil). No medicines x last month. No supplement use.     Objective   Vital Signs:  /72 (BP Location: Right arm, Patient Position: Sitting, Cuff Size: Adult)   Pulse 106   Temp 98.3 °F (36.8 °C) (Temporal)   Resp 16   Ht 175.3 cm (69\")   Wt 66.5 kg (146 lb 9.6 oz)   SpO2 98%   BMI 21.65 kg/m²   Estimated body mass index is 21.65 kg/m² as calculated from the following:    Height as of this encounter: 175.3 cm (69\").    Weight as of this encounter: 66.5 kg (146 lb 9.6 oz).  49 %ile (Z= -0.01) based on CDC (Girls, 2-20 Years) BMI-for-age based on BMI available as of 9/13/2023.    BMI is within normal parameters. No other follow-up for BMI required.      Physical Exam  Vitals and nursing note reviewed.   Constitutional:       General: She is not in acute distress.     Appearance: Normal appearance. She is well-developed and well-groomed. She is not ill-appearing, toxic-appearing or diaphoretic.      Interventions: Face mask in place.   HENT:    "   Head: Normocephalic and atraumatic.      Right Ear: Hearing normal.      Left Ear: Hearing normal.   Eyes:      General: Lids are normal. No scleral icterus.     Extraocular Movements: Extraocular movements intact.   Neck:      Trachea: Phonation normal.   Cardiovascular:      Rate and Rhythm: Normal rate and regular rhythm.   Pulmonary:      Effort: Pulmonary effort is normal.      Breath sounds: Normal breath sounds.   Musculoskeletal:      Cervical back: Neck supple.   Skin:     Coloration: Skin is not jaundiced or pale.      Findings: Bruising (legs) present.   Neurological:      General: No focal deficit present.      Mental Status: She is alert and oriented to person, place, and time.      Motor: Motor function is intact.   Psychiatric:         Attention and Perception: Attention and perception normal.         Mood and Affect: Mood and affect normal.         Speech: Speech normal.         Behavior: Behavior normal. Behavior is cooperative.         Thought Content: Thought content normal.         Cognition and Memory: Cognition and memory normal.         Judgment: Judgment normal.      Result Review :  The following data was reviewed by: Karly Morales MD on 09/13/2023:  Office Visit on 09/13/2023   Component Date Value Ref Range Status    SARS Antigen 09/13/2023 Detected (A)  Not Detected, Presumptive Negative Final    Influenza A Antigen NATALIE 09/13/2023 Not Detected  Not Detected Final    Influenza B Antigen NATALIE 09/13/2023 Not Detected  Not Detected Final    Internal Control 09/13/2023 Passed  Passed Final    Lot Number 09/13/2023 [id number]   Final    Expiration Date 09/13/2023 04/23/2024   Final    Rapid Strep A Screen 09/13/2023 Negative  Negative, VALID, INVALID, Not Performed Final    Internal Control 09/13/2023 Passed  Passed Final    Lot Number 09/13/2023 [id number]   Final    Expiration Date 09/13/2023 01/18/2026   Final                     Assessment and Plan   Diagnoses and all orders for this  visit:    1. Bruising, spontaneous (Primary)  -     CBC & Differential  -     Protime-INR  -     APTT  -     Thrombin Time  -     Fibrinogen  -     Comprehensive Metabolic Panel  -     TSH+Free T4  -     Iron Profile    2. COVID-19 virus detected  Comments:  symptomatic treatment, hydration, rest, wear mask,etc    3. Sore throat  -     POCT SARS-CoV-2 Antigen NATALIE + Flu  -     POC Rapid Strep A    4. Nasal congestion  -     POCT SARS-CoV-2 Antigen NATALIE + Flu    Pt plans to return for labs one day she feels better. Not on biotin.         Follow Up   Return for As Needed.  Patient was given instructions and counseling regarding her condition or for health maintenance advice. Please see specific information pulled into the AVS if appropriate.

## 2023-09-21 ENCOUNTER — TELEPHONE (OUTPATIENT)
Dept: INTERNAL MEDICINE | Facility: CLINIC | Age: 18
End: 2023-09-21
Payer: COMMERCIAL

## 2023-09-21 LAB
ALBUMIN SERPL-MCNC: 4.6 G/DL (ref 4–5)
ALBUMIN/GLOB SERPL: 1.8 {RATIO} (ref 1.2–2.2)
ALP SERPL-CCNC: 60 IU/L (ref 42–106)
ALT SERPL-CCNC: 7 IU/L (ref 0–32)
APTT PPP: 29 SEC (ref 24–33)
AST SERPL-CCNC: 16 IU/L (ref 0–40)
BASOPHILS # BLD AUTO: 0 X10E3/UL (ref 0–0.2)
BASOPHILS NFR BLD AUTO: 1 %
BILIRUB SERPL-MCNC: 0.8 MG/DL (ref 0–1.2)
BUN SERPL-MCNC: 7 MG/DL (ref 6–20)
BUN/CREAT SERPL: 11 (ref 9–23)
CALCIUM SERPL-MCNC: 9.4 MG/DL (ref 8.7–10.2)
CHLORIDE SERPL-SCNC: 99 MMOL/L (ref 96–106)
CO2 SERPL-SCNC: 23 MMOL/L (ref 20–29)
CREAT SERPL-MCNC: 0.61 MG/DL (ref 0.57–1)
EGFRCR SERPLBLD CKD-EPI 2021: 132 ML/MIN/1.73
EOSINOPHIL # BLD AUTO: 0.1 X10E3/UL (ref 0–0.4)
EOSINOPHIL NFR BLD AUTO: 1 %
ERYTHROCYTE [DISTWIDTH] IN BLOOD BY AUTOMATED COUNT: 11.4 % (ref 11.7–15.4)
FIBRINOGEN PPP-MCNC: 310 MG/DL (ref 193–507)
GLOBULIN SER CALC-MCNC: 2.5 G/DL (ref 1.5–4.5)
GLUCOSE SERPL-MCNC: 83 MG/DL (ref 70–99)
HCT VFR BLD AUTO: 41.8 % (ref 34–46.6)
HGB BLD-MCNC: 14.4 G/DL (ref 11.1–15.9)
IMM GRANULOCYTES # BLD AUTO: 0 X10E3/UL (ref 0–0.1)
IMM GRANULOCYTES NFR BLD AUTO: 0 %
INR PPP: 1 (ref 0.9–1.2)
IRON SATN MFR SERPL: 77 % (ref 15–55)
IRON SERPL-MCNC: 222 UG/DL (ref 27–159)
LYMPHOCYTES # BLD AUTO: 2 X10E3/UL (ref 0.7–3.1)
LYMPHOCYTES NFR BLD AUTO: 40 %
MCH RBC QN AUTO: 31.3 PG (ref 26.6–33)
MCHC RBC AUTO-ENTMCNC: 34.4 G/DL (ref 31.5–35.7)
MCV RBC AUTO: 91 FL (ref 79–97)
MONOCYTES # BLD AUTO: 0.3 X10E3/UL (ref 0.1–0.9)
MONOCYTES NFR BLD AUTO: 6 %
NEUTROPHILS # BLD AUTO: 2.5 X10E3/UL (ref 1.4–7)
NEUTROPHILS NFR BLD AUTO: 52 %
PLATELET # BLD AUTO: 255 X10E3/UL (ref 150–450)
POTASSIUM SERPL-SCNC: 4.1 MMOL/L (ref 3.5–5.2)
PROT SERPL-MCNC: 7.1 G/DL (ref 6–8.5)
PROTHROMBIN TIME: 10.9 SEC (ref 9.1–12)
RBC # BLD AUTO: 4.6 X10E6/UL (ref 3.77–5.28)
SODIUM SERPL-SCNC: 138 MMOL/L (ref 134–144)
T4 FREE SERPL-MCNC: 1.29 NG/DL (ref 0.93–1.6)
THROMBIN TIME: 17.8 SEC (ref 0–23)
TIBC SERPL-MCNC: 289 UG/DL (ref 250–450)
TSH SERPL DL<=0.005 MIU/L-ACNC: 1.53 UIU/ML (ref 0.45–4.5)
UIBC SERPL-MCNC: 67 UG/DL (ref 131–425)
WBC # BLD AUTO: 4.8 X10E3/UL (ref 3.4–10.8)

## 2023-09-21 NOTE — TELEPHONE ENCOUNTER
Caller: Joanie Renae    Relationship: Self    Best call back number: [phone]    What test was performed: BLOOD WORK    When was the test performed: 09/19/23    Where was the test performed: OFFICE    Additional notes: PATIENT STATED THAT SHE WOULD LIKE TO SPEAK WITH CLINICAL ABOUT RESULTS     PLEASE ADVISE

## 2023-09-21 NOTE — TELEPHONE ENCOUNTER
I told her that they haven't been reviewed yet, but she will get a call. She said that is perfectly fine and hung up.

## 2023-09-26 ENCOUNTER — PATIENT MESSAGE (OUTPATIENT)
Dept: INTERNAL MEDICINE | Facility: CLINIC | Age: 18
End: 2023-09-26
Payer: COMMERCIAL

## 2023-09-26 DIAGNOSIS — R79.0 ABNORMAL SERUM IRON LEVEL: Primary | ICD-10-CM

## 2024-01-26 ENCOUNTER — OFFICE VISIT (OUTPATIENT)
Dept: INTERNAL MEDICINE | Facility: CLINIC | Age: 19
End: 2024-01-26
Payer: COMMERCIAL

## 2024-01-26 VITALS
TEMPERATURE: 97.8 F | BODY MASS INDEX: 21.33 KG/M2 | WEIGHT: 144 LBS | RESPIRATION RATE: 15 BRPM | DIASTOLIC BLOOD PRESSURE: 61 MMHG | HEIGHT: 69 IN | SYSTOLIC BLOOD PRESSURE: 104 MMHG | OXYGEN SATURATION: 100 % | HEART RATE: 84 BPM

## 2024-01-26 DIAGNOSIS — T14.8XXA HEMATOMA: ICD-10-CM

## 2024-01-26 DIAGNOSIS — Z23 NEED FOR INFLUENZA VACCINATION: Primary | ICD-10-CM

## 2024-01-26 NOTE — PROGRESS NOTES
"Chief Complaint   Patient presents with    Hematoma     Hip-left sided from fall down stairs in Dec, brushing is good but pt still has a good size lump left       Subjective     History of Present Illness   Joanie Renae is a 18 y.o. female presenting for follow up.     Back on December 1st, she mentions that when she was leaving her place, she slipped down wet, wooden stairs on a rainy day. From then, she noticed signficant brusing along her left, upper buttock that extends across medial to lateral. The bruising has improved, but now there remains a \"lump\" along the left medial upper buttock. Denies any draining, tenderness, or erythema to the gloria, but just mentions that it is uncomfortable to sit on.     She is still able to work, go to the gym, and all other activities with ease. Has tried heating and massaging the area with little to no relief. She mentions that the lump is secluded to that area without any radiation of the swelling to anywhere else or any pain either.     The following portions of the patient's history were reviewed and updated as appropriate: allergies, current medications, past family history, past medical history, past social history, past surgical history and problem list.      Review of Systems   Musculoskeletal:  Positive for myalgias.   All other systems reviewed and are negative.      Allergies   Allergen Reactions    Bactrim [Sulfamethoxazole-Trimethoprim] Rash    Sulfate Hives       Past Medical History:   Diagnosis Date    Allergic     seasonal allergies    Anxiety     Borderline personality disorder     Depression     History of strep sore throat     Scoliosis        Social History     Socioeconomic History    Marital status: Single   Tobacco Use    Smoking status: Every Day     Types: Electronic Cigarette     Passive exposure: Never    Smokeless tobacco: Never   Vaping Use    Vaping Use: Some days    Substances: Nicotine, Flavoring   Substance and Sexual Activity    Alcohol use: " "Yes     Comment: socially    Drug use: Not Currently     Frequency: 2.0 times per week     Types: Marijuana    Sexual activity: Yes     Partners: Male     Birth control/protection: I.U.D.        No past surgical history on file.    Family History   Problem Relation Age of Onset    ADD / ADHD Maternal Aunt     Arthritis Other     Osteoporosis Other     Colon cancer Other     Diabetes Other         type II    Hyperlipidemia Other     Hypertension Other     Anxiety disorder Mother     No Known Problems Maternal Grandmother     No Known Problems Maternal Grandfather     Depression Father     Drug abuse Father          Current Outpatient Medications:     busPIRone (BUSPAR) 5 MG tablet, Take 1 tablet by mouth 2 (Two) Times a Day., Disp: , Rfl:     PROzac 20 MG capsule, Take 1 capsule by mouth Daily., Disp: , Rfl:     Objective   /61   Pulse 84   Temp 97.8 °F (36.6 °C)   Resp 15   Ht 175.3 cm (69\")   Wt 65.3 kg (144 lb)   SpO2 100%   BMI 21.27 kg/m²     Physical Exam  Musculoskeletal:      Left upper leg: Swelling and edema present.        Legs:        Assessment & Plan   Diagnoses and all orders for this visit:    1. Need for influenza vaccination (Primary)  -     Fluzone >6 Months (3604-5048)    2. Hematoma  -     US soft tissue; Future          Discussion Summary:  Patient is a 18 y.o. female presenting for follow up with persistent large left buttocks hematoma.  US soft tissue ordered to further evaluate as it has been about 2 month and she feels the swelling is possibly growing.      A lipoma is also a consideration as now she has no bruising but just a superficial bulge which seems to be overlying musculature.     Follow up:  No follow-ups on file.       "

## 2024-02-05 ENCOUNTER — HOSPITAL ENCOUNTER (OUTPATIENT)
Dept: ULTRASOUND IMAGING | Facility: HOSPITAL | Age: 19
Discharge: HOME | End: 2024-02-05
Admitting: INTERNAL MEDICINE
Payer: COMMERCIAL

## 2024-02-05 DIAGNOSIS — T14.8XXA HEMATOMA: ICD-10-CM

## 2024-02-05 PROCEDURE — 76999 ECHO EXAMINATION PROCEDURE: CPT

## 2024-02-06 NOTE — PROGRESS NOTES
Please let patient know that the lump appears consistent with a hematoma on US.  A repeat US in 2-3 months was recommended however we can consider this if it is not improving.  I expect it to improve with time.

## 2024-03-05 ENCOUNTER — PATIENT MESSAGE (OUTPATIENT)
Dept: INTERNAL MEDICINE | Facility: CLINIC | Age: 19
End: 2024-03-05
Payer: COMMERCIAL

## 2024-03-05 DIAGNOSIS — R23.3 ABNORMAL BRUISING: Primary | ICD-10-CM

## 2024-03-05 NOTE — TELEPHONE ENCOUNTER
From: Joanie Renae  To: Karly Morales  Sent: 3/5/2024 11:54 AM EST  Subject: Bruising    Hello I know we’ve done a couple blood tests these past 6 months and I’ve had high iron but I’m starting to get very concerned over the painful bruising all over my legs. Attached to this are images and I have had no injuries or falls this week. Are there any other tests that could be ran to find the cause of this so that I can get this under control before the warmer months come.

## 2024-03-22 DIAGNOSIS — R23.3 ABNORMAL BRUISING: Primary | ICD-10-CM

## 2024-04-06 ENCOUNTER — PATIENT MESSAGE (OUTPATIENT)
Dept: INTERNAL MEDICINE | Facility: CLINIC | Age: 19
End: 2024-04-06
Payer: COMMERCIAL

## 2024-04-08 DIAGNOSIS — R23.3 ABNORMAL BRUISING: ICD-10-CM

## 2024-04-08 DIAGNOSIS — T14.8XXA HEMATOMA: Primary | ICD-10-CM

## 2024-04-08 NOTE — TELEPHONE ENCOUNTER
From: Joanie Renae  To: Hayden Noriega  Sent: 4/6/2024 12:52 PM EDT  Subject: Hematoma     Hello you referred me to get an ultrasound on my hematoma a couple months ago and they told me to come back a couple months from that date if there’s no changes. Since there hasn’t been any changes I was wondering if you could just go ahead and put that referral in.

## 2024-04-30 NOTE — TELEPHONE ENCOUNTER
Immediate Brief Procedure Note    Patient: Shayne Cardenas    Pre-op Diagnosis: right hip pain    Post-op Diagnosis: Same    Procedure: right hip injection    Proceduralist: Leon Holland PA-C    Assistants: none    Anesthesia Staff: No anesthesia staff entered.    Anesthesia Type: local, 1% lidocaine    Findings: none    Estimated Blood Loss: <5cc    Complications: none immediate    Specimens Removed: no   Mother is at the school for patient to enroll and needs copy of immunization record faxed to [phone].

## 2024-05-21 ENCOUNTER — PATIENT MESSAGE (OUTPATIENT)
Dept: INTERNAL MEDICINE | Facility: CLINIC | Age: 19
End: 2024-05-21
Payer: COMMERCIAL

## 2024-05-22 RX ORDER — FLUCONAZOLE 150 MG/1
TABLET ORAL
Qty: 2 TABLET | Refills: 0 | Status: SHIPPED | OUTPATIENT
Start: 2024-05-22

## 2024-05-30 ENCOUNTER — APPOINTMENT (OUTPATIENT)
Dept: CT IMAGING | Facility: HOSPITAL | Age: 19
End: 2024-05-30
Payer: COMMERCIAL

## 2024-05-30 ENCOUNTER — HOSPITAL ENCOUNTER (EMERGENCY)
Facility: HOSPITAL | Age: 19
Discharge: HOME | End: 2024-05-30
Attending: EMERGENCY MEDICINE
Payer: COMMERCIAL

## 2024-05-30 VITALS
DIASTOLIC BLOOD PRESSURE: 79 MMHG | BODY MASS INDEX: 19.99 KG/M2 | RESPIRATION RATE: 18 BRPM | TEMPERATURE: 98.4 F | SYSTOLIC BLOOD PRESSURE: 117 MMHG | OXYGEN SATURATION: 100 % | HEART RATE: 107 BPM | WEIGHT: 135 LBS | HEIGHT: 69 IN

## 2024-05-30 DIAGNOSIS — N30.01 ACUTE CYSTITIS WITH HEMATURIA: Primary | ICD-10-CM

## 2024-05-30 LAB
ALBUMIN SERPL-MCNC: 4.8 G/DL (ref 3.5–5.2)
ALBUMIN/GLOB SERPL: 1.8 G/DL
ALP SERPL-CCNC: 52 U/L (ref 39–117)
ALT SERPL W P-5'-P-CCNC: 8 U/L (ref 1–33)
ANION GAP SERPL CALCULATED.3IONS-SCNC: 7 MMOL/L (ref 5–15)
AST SERPL-CCNC: 15 U/L (ref 1–32)
B-HCG UR QL: NEGATIVE
BACTERIA UR QL AUTO: ABNORMAL /HPF
BASOPHILS # BLD AUTO: 0.04 10*3/MM3 (ref 0–0.2)
BASOPHILS NFR BLD AUTO: 0.4 % (ref 0–1.5)
BILIRUB SERPL-MCNC: 0.9 MG/DL (ref 0–1.2)
BILIRUB UR QL STRIP: ABNORMAL
BUN SERPL-MCNC: 8 MG/DL (ref 6–20)
BUN/CREAT SERPL: 10.3 (ref 7–25)
CALCIUM SPEC-SCNC: 9.6 MG/DL (ref 8.6–10.5)
CHLORIDE SERPL-SCNC: 103 MMOL/L (ref 98–107)
CLARITY UR: ABNORMAL
CO2 SERPL-SCNC: 31 MMOL/L (ref 22–29)
COLOR UR: ABNORMAL
CREAT SERPL-MCNC: 0.78 MG/DL (ref 0.57–1)
DEPRECATED RDW RBC AUTO: 42.9 FL (ref 37–54)
EGFRCR SERPLBLD CKD-EPI 2021: 111.7 ML/MIN/1.73
EOSINOPHIL # BLD AUTO: 0.08 10*3/MM3 (ref 0–0.4)
EOSINOPHIL NFR BLD AUTO: 0.8 % (ref 0.3–6.2)
ERYTHROCYTE [DISTWIDTH] IN BLOOD BY AUTOMATED COUNT: 12.3 % (ref 12.3–15.4)
EXPIRATION DATE: NORMAL
GLOBULIN UR ELPH-MCNC: 2.6 GM/DL
GLUCOSE SERPL-MCNC: 86 MG/DL (ref 65–99)
GLUCOSE UR STRIP-MCNC: ABNORMAL MG/DL
HCT VFR BLD AUTO: 44.5 % (ref 34–46.6)
HGB BLD-MCNC: 15 G/DL (ref 12–15.9)
HGB UR QL STRIP.AUTO: ABNORMAL
HOLD SPECIMEN: NORMAL
HYALINE CASTS UR QL AUTO: ABNORMAL /LPF
IMM GRANULOCYTES # BLD AUTO: 0.04 10*3/MM3 (ref 0–0.05)
IMM GRANULOCYTES NFR BLD AUTO: 0.4 % (ref 0–0.5)
INTERNAL NEGATIVE CONTROL: NEGATIVE
INTERNAL POSITIVE CONTROL: POSITIVE
KETONES UR QL STRIP: ABNORMAL
LEUKOCYTE ESTERASE UR QL STRIP.AUTO: ABNORMAL
LIPASE SERPL-CCNC: 23 U/L (ref 13–60)
LYMPHOCYTES # BLD AUTO: 1.89 10*3/MM3 (ref 0.7–3.1)
LYMPHOCYTES NFR BLD AUTO: 20 % (ref 19.6–45.3)
Lab: NORMAL
MCH RBC QN AUTO: 31.7 PG (ref 26.6–33)
MCHC RBC AUTO-ENTMCNC: 33.7 G/DL (ref 31.5–35.7)
MCV RBC AUTO: 94.1 FL (ref 79–97)
MONOCYTES # BLD AUTO: 0.42 10*3/MM3 (ref 0.1–0.9)
MONOCYTES NFR BLD AUTO: 4.5 % (ref 5–12)
NEUTROPHILS NFR BLD AUTO: 6.96 10*3/MM3 (ref 1.7–7)
NEUTROPHILS NFR BLD AUTO: 73.9 % (ref 42.7–76)
NITRITE UR QL STRIP: POSITIVE
NRBC BLD AUTO-RTO: 0 /100 WBC (ref 0–0.2)
PH UR STRIP.AUTO: 6.5 [PH] (ref 5–8)
PLATELET # BLD AUTO: 277 10*3/MM3 (ref 140–450)
PMV BLD AUTO: 9.8 FL (ref 6–12)
POTASSIUM SERPL-SCNC: 3.7 MMOL/L (ref 3.5–5.2)
PROT SERPL-MCNC: 7.4 G/DL (ref 6–8.5)
PROT UR QL STRIP: ABNORMAL
RBC # BLD AUTO: 4.73 10*6/MM3 (ref 3.77–5.28)
RBC # UR STRIP: ABNORMAL /HPF
REF LAB TEST METHOD: ABNORMAL
SODIUM SERPL-SCNC: 141 MMOL/L (ref 136–145)
SP GR UR STRIP: 1.02 (ref 1–1.03)
SQUAMOUS #/AREA URNS HPF: ABNORMAL /HPF
UROBILINOGEN UR QL STRIP: ABNORMAL
WBC # UR STRIP: ABNORMAL /HPF
WBC NRBC COR # BLD AUTO: 9.43 10*3/MM3 (ref 3.4–10.8)
WHOLE BLOOD HOLD COAG: NORMAL
WHOLE BLOOD HOLD SPECIMEN: NORMAL

## 2024-05-30 PROCEDURE — 81001 URINALYSIS AUTO W/SCOPE: CPT | Performed by: PHYSICIAN ASSISTANT

## 2024-05-30 PROCEDURE — 74177 CT ABD & PELVIS W/CONTRAST: CPT

## 2024-05-30 PROCEDURE — 25510000001 IOPAMIDOL 61 % SOLUTION: Performed by: EMERGENCY MEDICINE

## 2024-05-30 PROCEDURE — 81025 URINE PREGNANCY TEST: CPT | Performed by: PHYSICIAN ASSISTANT

## 2024-05-30 PROCEDURE — 85025 COMPLETE CBC W/AUTO DIFF WBC: CPT | Performed by: PHYSICIAN ASSISTANT

## 2024-05-30 PROCEDURE — 99285 EMERGENCY DEPT VISIT HI MDM: CPT

## 2024-05-30 PROCEDURE — 25810000003 SODIUM CHLORIDE 0.9 % SOLUTION: Performed by: PHYSICIAN ASSISTANT

## 2024-05-30 PROCEDURE — 80053 COMPREHEN METABOLIC PANEL: CPT | Performed by: PHYSICIAN ASSISTANT

## 2024-05-30 PROCEDURE — 83690 ASSAY OF LIPASE: CPT | Performed by: PHYSICIAN ASSISTANT

## 2024-05-30 PROCEDURE — 87086 URINE CULTURE/COLONY COUNT: CPT | Performed by: PHYSICIAN ASSISTANT

## 2024-05-30 RX ORDER — CEFUROXIME AXETIL 500 MG/1
500 TABLET ORAL 2 TIMES DAILY
Qty: 20 TABLET | Refills: 0 | Status: SHIPPED | OUTPATIENT
Start: 2024-05-30 | End: 2024-06-09

## 2024-05-30 RX ORDER — CEFUROXIME AXETIL 500 MG/1
500 TABLET ORAL 2 TIMES DAILY
Qty: 20 TABLET | Refills: 0 | Status: SHIPPED | OUTPATIENT
Start: 2024-05-30 | End: 2024-05-30

## 2024-05-30 RX ADMIN — SODIUM CHLORIDE 1000 ML: 9 INJECTION, SOLUTION INTRAVENOUS at 10:35

## 2024-05-30 RX ADMIN — IOPAMIDOL 90 ML: 612 INJECTION, SOLUTION INTRAVENOUS at 10:59

## 2024-05-30 NOTE — Clinical Note
[facility] EMERGENCY DEPARTMENT  [address]  [facility] [address]  Phone: [phone]    Joanie Renae was seen and treated in our emergency department on 5/30/2024.  She may return to work on 05/31/2024.         Thank you for choosing [facility].    Rita Payne, PA

## 2024-05-30 NOTE — ED PROVIDER NOTES
Subjective   History of Present Illness  Pt is a 17 yo female presenting to ED with complaints of blood in her urine and abdominal pain. PMHx significant for Anxiety, Depression, Endometriosis and Scoliosis. Pt explains several weeks ago had blood in her urine at an OBGYN follow up and no obvious infection so was told to monitor. She then had a menstrual cycle which stopped a few days ago. Over the last few days persistent worsening blood in urine, burning, frequency and lower abdominal pain. She went to [facility] and given antibiotic for UTI and was told to come to ED if worse sx. She has not picked up the antibiotic. She denies fever, CP, SOB, cough or N/V/D. She vapes and uses ETOH but denies drug use.    History provided by:  Patient and medical records      Review of Systems   Constitutional:  Negative for fever.   Respiratory:  Negative for cough and shortness of breath.    Cardiovascular:  Negative for chest pain.   Gastrointestinal:  Positive for abdominal pain. Negative for diarrhea, nausea and vomiting.   Genitourinary:  Positive for dysuria, flank pain, frequency and hematuria. Negative for vaginal bleeding.   Musculoskeletal:  Positive for back pain.   Neurological:  Negative for dizziness and weakness.       Past Medical History:   Diagnosis Date    Allergic     seasonal allergies    Anxiety     Borderline personality disorder     Depression     History of strep sore throat     Scoliosis        Allergies   Allergen Reactions    Bactrim [Sulfamethoxazole-Trimethoprim] Rash    Sulfate Hives       History reviewed. No pertinent surgical history.    Family History   Problem Relation Age of Onset    ADD / ADHD Maternal Aunt     Arthritis Other     Osteoporosis Other     Colon cancer Other     Diabetes Other         type II    Hyperlipidemia Other     Hypertension Other     Anxiety disorder Mother     No Known Problems Maternal Grandmother     No Known Problems Maternal Grandfather     Depression Father      Drug abuse Father        Social History     Socioeconomic History    Marital status: Single   Tobacco Use    Smoking status: Every Day     Types: Electronic Cigarette     Passive exposure: Never    Smokeless tobacco: Never   Vaping Use    Vaping status: Some Days    Substances: Nicotine, Flavoring   Substance and Sexual Activity    Alcohol use: Yes     Comment: socially    Drug use: Not Currently     Frequency: 2.0 times per week     Types: Marijuana    Sexual activity: Yes     Partners: Male     Birth control/protection: I.U.D.           Objective   Physical Exam  Vitals and nursing note reviewed.   Constitutional:       Appearance: She is well-developed.   HENT:      Head: Atraumatic.      Nose: Nose normal.   Eyes:      General: Lids are normal.      Conjunctiva/sclera: Conjunctivae normal.      Pupils: Pupils are equal, round, and reactive to light.   Cardiovascular:      Rate and Rhythm: Regular rhythm. Tachycardia present.      Heart sounds: Normal heart sounds.   Pulmonary:      Effort: Pulmonary effort is normal.      Breath sounds: Normal breath sounds. No wheezing.   Abdominal:      General: There is no distension.      Palpations: Abdomen is soft.      Tenderness: There is abdominal tenderness in the right lower quadrant, periumbilical area, suprapubic area and left lower quadrant. There is no right CVA tenderness, left CVA tenderness, guarding or rebound.   Musculoskeletal:         General: No tenderness. Normal range of motion.      Cervical back: Normal range of motion and neck supple.   Skin:     General: Skin is warm and dry.      Findings: No erythema or rash.   Neurological:      Mental Status: She is alert and oriented to person, place, and time.      Sensory: No sensory deficit.   Psychiatric:         Speech: Speech normal.         Behavior: Behavior normal.         Procedures           ED Course      Recent Results (from the past 24 hour(s))   Comprehensive Metabolic Panel    Collection Time:  05/30/24 10:34 AM    Specimen: Blood   Result Value Ref Range    Glucose 86 65 - 99 mg/dL    BUN 8 6 - 20 mg/dL    Creatinine 0.78 0.57 - 1.00 mg/dL    Sodium 141 136 - 145 mmol/L    Potassium 3.7 3.5 - 5.2 mmol/L    Chloride 103 98 - 107 mmol/L    CO2 31.0 (H) 22.0 - 29.0 mmol/L    Calcium 9.6 8.6 - 10.5 mg/dL    Total Protein 7.4 6.0 - 8.5 g/dL    Albumin 4.8 3.5 - 5.2 g/dL    ALT (SGPT) 8 1 - 33 U/L    AST (SGOT) 15 1 - 32 U/L    Alkaline Phosphatase 52 39 - 117 U/L    Total Bilirubin 0.9 0.0 - 1.2 mg/dL    Globulin 2.6 gm/dL    A/G Ratio 1.8 g/dL    BUN/Creatinine Ratio 10.3 7.0 - 25.0    Anion Gap 7.0 5.0 - 15.0 mmol/L    eGFR 111.7 >60.0 mL/min/1.73   Lipase    Collection Time: 05/30/24 10:34 AM    Specimen: Blood   Result Value Ref Range    Lipase 23 13 - 60 U/L   CBC Auto Differential    Collection Time: 05/30/24 10:34 AM    Specimen: Blood   Result Value Ref Range    WBC 9.43 3.40 - 10.80 10*3/mm3    RBC 4.73 3.77 - 5.28 10*6/mm3    Hemoglobin 15.0 12.0 - 15.9 g/dL    Hematocrit 44.5 34.0 - 46.6 %    MCV 94.1 79.0 - 97.0 fL    MCH 31.7 26.6 - 33.0 pg    MCHC 33.7 31.5 - 35.7 g/dL    RDW 12.3 12.3 - 15.4 %    RDW-SD 42.9 37.0 - 54.0 fl    MPV 9.8 6.0 - 12.0 fL    Platelets 277 140 - 450 10*3/mm3    Neutrophil % 73.9 42.7 - 76.0 %    Lymphocyte % 20.0 19.6 - 45.3 %    Monocyte % 4.5 (L) 5.0 - 12.0 %    Eosinophil % 0.8 0.3 - 6.2 %    Basophil % 0.4 0.0 - 1.5 %    Immature Grans % 0.4 0.0 - 0.5 %    Neutrophils, Absolute 6.96 1.70 - 7.00 10*3/mm3    Lymphocytes, Absolute 1.89 0.70 - 3.10 10*3/mm3    Monocytes, Absolute 0.42 0.10 - 0.90 10*3/mm3    Eosinophils, Absolute 0.08 0.00 - 0.40 10*3/mm3    Basophils, Absolute 0.04 0.00 - 0.20 10*3/mm3    Immature Grans, Absolute 0.04 0.00 - 0.05 10*3/mm3    nRBC 0.0 0.0 - 0.2 /100 WBC   Green Top (Gel)    Collection Time: 05/30/24 10:34 AM   Result Value Ref Range    Extra Tube Hold for add-ons.    Lavender Top    Collection Time: 05/30/24 10:34 AM   Result Value Ref  Range    Extra Tube hold for add-on    Gold Top - SST    Collection Time: 05/30/24 10:34 AM   Result Value Ref Range    Extra Tube Hold for add-ons.    Gray Top    Collection Time: 05/30/24 10:34 AM   Result Value Ref Range    Extra Tube Hold for add-ons.    Light Blue Top    Collection Time: 05/30/24 10:34 AM   Result Value Ref Range    Extra Tube Hold for add-ons.    Urinalysis With Culture If Indicated - Urine, Clean Catch    Collection Time: 05/30/24 10:35 AM    Specimen: Urine, Clean Catch   Result Value Ref Range    Color, UA Brown (A) Yellow, Straw    Appearance, UA Turbid (A) Clear    pH, UA 6.5 5.0 - 8.0    Specific Gravity, UA 1.025 1.005 - 1.030    Glucose,  mg/dL (1+) (A) Negative    Ketones, UA Trace (A) Negative    Bilirubin, UA Large (3+) (A) Negative    Blood, UA Large (3+) (A) Negative    Protein, UA >=300 mg/dL (3+) (A) Negative    Leuk Esterase, UA Moderate (2+) (A) Negative    Nitrite, UA Positive (A) Negative    Urobilinogen, UA 2.0 E.U./dL (A) 0.2 - 1.0 E.U./dL   Urinalysis, Microscopic Only - Urine, Clean Catch    Collection Time: 05/30/24 10:35 AM    Specimen: Urine, Clean Catch   Result Value Ref Range    RBC, UA Too Numerous to Count (A) None Seen, 0-2 /HPF    WBC, UA Unable to determine due to loaded field (A) None Seen, 0-2 /HPF    Bacteria, UA Unable to determine due to loaded field (A) None Seen, Trace /HPF    Squamous Epithelial Cells, UA Unable to determine due to loaded field (A) None Seen, 0-2 /HPF    Hyaline Casts, UA Unable to determine due to loaded field 0 - 6 /LPF    Methodology Manual Light Microscopy    POC Urine Pregnancy    Collection Time: 05/30/24 10:42 AM    Specimen: Urine   Result Value Ref Range    HCG, Urine, QL Negative Negative    Lot Number [id number]     Internal Positive Control Positive Positive, Passed    Internal Negative Control Negative Negative, Passed    Expiration Date 01/28/2025      Note: In addition to lab results from this visit, the labs listed  "above may include labs taken at another facility or during a different encounter within the last 24 hours. Please correlate lab times with ED admission and discharge times for further clarification of the services performed during this visit.    CT Abdomen Pelvis With Contrast   Final Result   Impression:   No evidence of acute intra-abdominal abnormality.            Electronically Signed: Pablito Maier MD     5/30/2024 11:41 AM EDT     Workstation ID: [id number]        Vitals:    05/30/24 1012   BP: 117/79   BP Location: Left arm   Patient Position: Sitting   Pulse: 107   Resp: 18   Temp: 98.4 °F (36.9 °C)   TempSrc: Oral   SpO2: 100%   Weight: 61.2 kg (135 lb)   Height: 175.3 cm (69\")     Medications   sodium chloride 0.9 % bolus 1,000 mL (0 mL Intravenous Stopped 5/30/24 1159)   iopamidol (ISOVUE-300) 61 % injection 100 mL (90 mL Intravenous Given 5/30/24 1059)     ECG/EMG Results (last 24 hours)       ** No results found for the last 24 hours. **          No orders to display                                              Medical Decision Making  Pt is a 19 yo female presenting to ED with complaints of abdominal pain and bloody urine. Labs in ED notable for WBC 9.4, Cr 0.78, Glucose 86, K 3.7 and Na 141. UA with TNTC RBC, nitrate and leukocytes. CT abd/pelvis with tout acute findings. Will dc home on Ceftin and culture urine. She will f/u with PCP and return to ED if new / worse sx.     DDX  UTI, Pyelonephritis, Kidney stone, Sepsis, Anemia, KALIN    Problems Addressed:  Acute cystitis with hematuria: complicated acute illness or injury    Amount and/or Complexity of Data Reviewed  External Data Reviewed: notes.     Details: Reviewed previous non ED visits including prior labs, imaging, available notes, medications, allergies and surgical hx.     Labs: ordered. Decision-making details documented in ED Course.  Radiology: ordered. Decision-making details documented in ED Course.    Risk  Prescription drug " management.        Final diagnoses:   Acute cystitis with hematuria       ED Disposition  ED Disposition       ED Disposition   Discharge    Condition   Stable    Comment   --               Karly Morales MD  [address] [address] [phone]    Schedule an appointment as soon as possible for a visit       [facility] EMERGENCY DEPARTMENT  [address] [facility] [address] [phone]    If symptoms worsen         Medication List        New Prescriptions      cefuroxime 500 MG tablet  Commonly known as: CEFTIN  Take 1 tablet by mouth 2 (Two) Times a Day for 10 days.               Where to Get Your Medications        These medications were sent to [id number] - [place], [place] - [address] [facility] [address] - [phone]  - [phone]   [address] [address]      Phone: [phone]   cefuroxime 500 MG tablet            Rita Payne PA  05/30/24 1940

## 2024-05-30 NOTE — Clinical Note
[facility] EMERGENCY DEPARTMENT  [address]  [facility] [address]  Phone: [phone]    Joanie Renae was seen and treated in our emergency department on 5/30/2024.  She may return to work on 05/31/2024.         Thank you for choosing [facility].    Rita Payne, PA

## 2024-05-31 ENCOUNTER — PATIENT MESSAGE (OUTPATIENT)
Dept: INTERNAL MEDICINE | Facility: CLINIC | Age: 19
End: 2024-05-31
Payer: COMMERCIAL

## 2024-05-31 LAB — BACTERIA SPEC AEROBE CULT: NORMAL

## 2024-06-11 ENCOUNTER — OFFICE VISIT (OUTPATIENT)
Dept: INTERNAL MEDICINE | Facility: CLINIC | Age: 19
End: 2024-06-11
Payer: COMMERCIAL

## 2024-06-11 VITALS
OXYGEN SATURATION: 98 % | WEIGHT: 137 LBS | SYSTOLIC BLOOD PRESSURE: 116 MMHG | HEART RATE: 104 BPM | BODY MASS INDEX: 20.29 KG/M2 | TEMPERATURE: 97.7 F | DIASTOLIC BLOOD PRESSURE: 62 MMHG | HEIGHT: 69 IN

## 2024-06-11 DIAGNOSIS — N30.01 ACUTE CYSTITIS WITH HEMATURIA: Primary | ICD-10-CM

## 2024-06-11 LAB
BILIRUB BLD-MCNC: NEGATIVE MG/DL
CLARITY, POC: ABNORMAL
COLOR UR: YELLOW
EXPIRATION DATE: ABNORMAL
GLUCOSE UR STRIP-MCNC: NEGATIVE MG/DL
KETONES UR QL: NEGATIVE
LEUKOCYTE EST, POC: NEGATIVE
Lab: ABNORMAL
NITRITE UR-MCNC: NEGATIVE MG/ML
PH UR: 6 [PH] (ref 5–8)
PROT UR STRIP-MCNC: NEGATIVE MG/DL
RBC # UR STRIP: NEGATIVE /UL
SP GR UR: 1.02 (ref 1–1.03)
UROBILINOGEN UR QL: NORMAL

## 2024-06-11 PROCEDURE — 81003 URINALYSIS AUTO W/O SCOPE: CPT | Performed by: NURSE PRACTITIONER

## 2024-06-11 PROCEDURE — 99213 OFFICE O/P EST LOW 20 MIN: CPT | Performed by: NURSE PRACTITIONER

## 2024-06-11 NOTE — PROGRESS NOTES
"  Office Visit      Patient Name: Joanie Renae  : 2005   MRN: [id number]   Care Team: Patient Care Team:  Karly Morales MD as PCP - General (Family Medicine)    Chief Complaint  Follow-up (Pt went to [facility] ER on  with complaints of blood in her urine and abdominal pain. Last dose of abx was yesterday. )    Subjective     Subjective      History of Present Illness  18 year old female presents for hospital follow up UTI.   UTI was characterized by anna hematuria and abdominal pain. She was prescribed a 10-day course of Ceftin 500 mg, which she completed. Symptoms have resolved. She gets about 2 UTIs per year.Triggers include baths/hot tubs and intercourse.  She does void after intercourse.  No fever, myalgia, chills, frequency, urgency, back pain, abdominal pain, hematuria.  She does drink 1 cup of coffee a day.  Does not drink any soda.  She has been pushing water.  CT of abdomen and pelvis was normal.    Objective     Objective   Vital Signs:   /62   Pulse 104   Temp 97.7 °F (36.5 °C)   Ht 175.3 cm (69\")   Wt 62.1 kg (137 lb)   SpO2 98%   BMI 20.23 kg/m²     BMI is within normal parameters. No other follow-up for BMI required.       Physical Exam  Vitals and nursing note reviewed.   Constitutional:       General: She is not in acute distress.     Appearance: Normal appearance. She is normal weight.   HENT:      Head: Normocephalic and atraumatic.   Eyes:      Extraocular Movements: Extraocular movements intact.   Cardiovascular:      Rate and Rhythm: Normal rate and regular rhythm.   Pulmonary:      Effort: Pulmonary effort is normal.      Breath sounds: Normal breath sounds.   Abdominal:      Tenderness: There is no abdominal tenderness. There is no right CVA tenderness or left CVA tenderness.   Musculoskeletal:         General: Normal range of motion.      Cervical back: Normal range of motion.   Neurological:      Mental Status: She is alert and oriented to person, place, and " time.   Psychiatric:         Mood and Affect: Mood normal.        Labs:   Results for orders placed or performed in visit on 06/11/24   POCT urinalysis dipstick, automated    Specimen: Urine   Result Value Ref Range    Color Yellow Yellow, Straw, Dark Yellow, Jana    Clarity, UA Slightly Cloudy (A) Clear    Specific Gravity  1.025 1.005 - 1.030    pH, Urine 6.0 5.0 - 8.0    Leukocytes Negative Negative    Nitrite, UA Negative Negative    Protein, POC Negative Negative mg/dL    Glucose, UA Negative Negative mg/dL    Ketones, UA Negative Negative    Urobilinogen, UA Normal Normal, 0.2 E.U./dL    Bilirubin Negative Negative    Blood, UA Negative Negative    Lot Number [id number]     Expiration Date 1/14/25        Assessment & Plan   Problem List Items Addressed This Visit    None  Visit Diagnoses       Acute cystitis with hematuria    -  Primary    Relevant Orders    POCT urinalysis dipstick, automated (Completed)          Urinalysis normal   Symptoms resolved   RTC if any returning symptoms     Follow Up   Return if symptoms worsen or fail to improve.  Patient was given instructions and counseling regarding her condition or for health maintenance advice. Please see specific information pulled into the AVS if appropriate.     YAQUELIN Salgado  [facility]    Patient or patient representative verbalized consent for the use of Ambient Listening during the visit with  YAQUELIN Franks for chart documentation. 6/11/2024  15:53 EDT

## 2024-06-18 ENCOUNTER — HOSPITAL ENCOUNTER (OUTPATIENT)
Dept: ULTRASOUND IMAGING | Facility: HOSPITAL | Age: 19
Discharge: HOME | End: 2024-06-18
Admitting: INTERNAL MEDICINE
Payer: COMMERCIAL

## 2024-06-18 DIAGNOSIS — R23.3 ABNORMAL BRUISING: ICD-10-CM

## 2024-06-18 DIAGNOSIS — T14.8XXA HEMATOMA: ICD-10-CM

## 2024-06-18 PROCEDURE — 76999 ECHO EXAMINATION PROCEDURE: CPT

## 2024-06-19 NOTE — PROGRESS NOTES
This US was for follow up on a hematoma after fall and injury but it has been a long time.  CT was done recently and shows area of fat necrosis.  This may need follow up.